# Patient Record
Sex: MALE | Race: WHITE | NOT HISPANIC OR LATINO | Employment: FULL TIME | URBAN - METROPOLITAN AREA
[De-identification: names, ages, dates, MRNs, and addresses within clinical notes are randomized per-mention and may not be internally consistent; named-entity substitution may affect disease eponyms.]

---

## 2019-08-16 ENCOUNTER — OFFICE VISIT (OUTPATIENT)
Dept: URGENT CARE | Facility: PHYSICIAN GROUP | Age: 34
End: 2019-08-16
Payer: COMMERCIAL

## 2019-08-16 VITALS
WEIGHT: 163 LBS | TEMPERATURE: 98.2 F | OXYGEN SATURATION: 96 % | SYSTOLIC BLOOD PRESSURE: 114 MMHG | HEIGHT: 67 IN | RESPIRATION RATE: 12 BRPM | BODY MASS INDEX: 25.58 KG/M2 | HEART RATE: 89 BPM | DIASTOLIC BLOOD PRESSURE: 82 MMHG

## 2019-08-16 DIAGNOSIS — K52.9 GASTROENTERITIS: ICD-10-CM

## 2019-08-16 PROCEDURE — 99213 OFFICE O/P EST LOW 20 MIN: CPT | Performed by: PHYSICIAN ASSISTANT

## 2019-08-16 RX ORDER — ONDANSETRON 4 MG/1
4 TABLET, ORALLY DISINTEGRATING ORAL EVERY 6 HOURS PRN
Qty: 10 TAB | Refills: 0 | Status: SHIPPED | OUTPATIENT
Start: 2019-08-16

## 2019-08-16 ASSESSMENT — ENCOUNTER SYMPTOMS
CHILLS: 1
FEVER: 0
BLURRED VISION: 0
BACK PAIN: 0
MYALGIAS: 0
ABDOMINAL PAIN: 1
COUGH: 0
SORE THROAT: 0
DOUBLE VISION: 0
HEADACHES: 0
DIARRHEA: 1
DIZZINESS: 1
SHORTNESS OF BREATH: 0
SINUS PAIN: 0
NAUSEA: 1
VOMITING: 1
FLANK PAIN: 0

## 2019-08-16 ASSESSMENT — PAIN SCALES - GENERAL: PAINLEVEL: 1=MINIMAL PAIN

## 2019-08-16 NOTE — PROGRESS NOTES
Subjective:      Parish Sinha is a 34 y.o. male who presents with Abdominal Pain (diarrhea nausea, lower abdomenal pain, sharp stomach pains x1 day, vomiting, shivers and chills, nose bleed)          34-year-old male presents to urgent care complaining of sudden onset of n/v, diarrhea, and sharp abdominal pain onset 1 day ago.   Reports of loss of appetite. Denies recent camping, drinking out of streams, or recent travel.  Denies sick contacts.  Denies blood in stool.  No fevers.  Patient has not taken or any over the counter medications for pain.  He denies any other associated relieving or aggravating factors.  Denies hx of same. Denies abdominal surgical hx.       Review of Systems   Constitutional: Positive for chills. Negative for fever.   HENT: Positive for nosebleeds. Negative for congestion, ear pain, sinus pain and sore throat.    Eyes: Negative for blurred vision and double vision.   Respiratory: Negative for cough and shortness of breath.    Cardiovascular: Negative for chest pain.   Gastrointestinal: Positive for abdominal pain, diarrhea, nausea and vomiting.   Genitourinary: Negative for dysuria and flank pain.   Musculoskeletal: Negative for back pain and myalgias.   Neurological: Positive for dizziness. Negative for headaches.     History reviewed. No pertinent past medical history.  Current Outpatient Medications on File Prior to Visit   Medication Sig Dispense Refill   • ibuprofen (MOTRIN) 800 MG TABS Take 1 Tab by mouth every 8 hours as needed for Mild Pain. 90 Each 1   • amoxicillin-clavulanate (AUGMENTIN) 875-125 MG Tab Take 1 Tab by mouth 2 times a day. (Patient not taking: Reported on 8/16/2019) 20 Tab 0   • hydrocodone-acetaminophen (NORCO) 5-325 MG Tab per tablet 1 tablet at bedtime prn severe pain.  No driving or working on this medication. (Patient not taking: Reported on 8/16/2019) 6 Tab 0   • Amphetamine-Dextroamphetamine (ADDERALL PO) Take  by mouth.     • Oxycodone-Acetaminophen  "(PERCOCET-10)  MG Tab Take 1-2 Tabs by mouth every four hours as needed for Severe Pain. (Patient not taking: Reported on 8/16/2019) 20 Tab 0   • carisoprodol (SOMA) 350 MG Tab Take 1 Tab by mouth every 8 hours as needed for Muscle Spasms. (Patient not taking: Reported on 8/16/2019) 30 Tab 0   • sildenafil citrate (VIAGRA) 50 MG tablet Take 1 Tab by mouth as needed for Erectile Dysfunction. (Patient not taking: Reported on 8/16/2019) 10 Tab 3     No current facility-administered medications on file prior to visit.      No Known Allergies  Social History     Tobacco Use   • Smoking status: Former Smoker     Packs/day: 0.50     Years: 12.00     Pack years: 6.00     Types: Cigarettes   • Smokeless tobacco: Never Used   Substance Use Topics   • Alcohol use: Yes     Comment: occational           Objective:     /82   Pulse 89   Temp 36.8 °C (98.2 °F)   Resp 12   Ht 1.695 m (5' 6.75\")   Wt 73.9 kg (163 lb)   SpO2 96%   BMI 25.72 kg/m²      Physical Exam   Constitutional: He is oriented to person, place, and time. He appears well-developed and well-nourished.   HENT:   Head: Normocephalic and atraumatic.   Mouth/Throat: Oropharynx is clear and moist.   Eyes: Conjunctivae and EOM are normal.   Neck: Normal range of motion. Neck supple.   Cardiovascular: Normal rate and regular rhythm.   Pulmonary/Chest: Effort normal. No respiratory distress.   Abdominal: Soft. Bowel sounds are increased. There is generalized tenderness. There is no rigidity, no rebound, no guarding, no CVA tenderness, no tenderness at McBurney's point and negative Ji's sign.   Musculoskeletal: Normal range of motion.   Neurological: He is alert and oriented to person, place, and time.   Skin: Skin is warm and dry. No rash noted. No erythema.   Psychiatric: He has a normal mood and affect. His behavior is normal. Judgment and thought content normal.   Vitals reviewed.              Assessment/Plan:     1. Gastroenteritis  ondansetron " (ZOFRAN ODT) 4 MG TABLET DISPERSIBLE     Advised patient symptoms are most likely viral in etiology, recommend supportive care. Increased fluids and rest.  Discussed red flags and reasons to be seen in the emergency department or return to urgent care for further evaluation.  The patient demonstrated a good understanding and agreed with the treatment plan.